# Patient Record
Sex: MALE | Race: WHITE | NOT HISPANIC OR LATINO
[De-identification: names, ages, dates, MRNs, and addresses within clinical notes are randomized per-mention and may not be internally consistent; named-entity substitution may affect disease eponyms.]

---

## 2017-10-06 PROBLEM — Z00.00 ENCOUNTER FOR PREVENTIVE HEALTH EXAMINATION: Status: ACTIVE | Noted: 2017-10-06

## 2017-10-30 ENCOUNTER — APPOINTMENT (OUTPATIENT)
Dept: PULMONOLOGY | Facility: CLINIC | Age: 52
End: 2017-10-30
Payer: COMMERCIAL

## 2017-10-30 VITALS — HEART RATE: 80 BPM | OXYGEN SATURATION: 98 %

## 2017-10-30 VITALS
SYSTOLIC BLOOD PRESSURE: 120 MMHG | HEIGHT: 70 IN | BODY MASS INDEX: 37.22 KG/M2 | WEIGHT: 260 LBS | DIASTOLIC BLOOD PRESSURE: 80 MMHG

## 2017-10-30 DIAGNOSIS — Z80.6 FAMILY HISTORY OF LEUKEMIA: ICD-10-CM

## 2017-10-30 DIAGNOSIS — Z83.3 FAMILY HISTORY OF DIABETES MELLITUS: ICD-10-CM

## 2017-10-30 DIAGNOSIS — Z80.3 FAMILY HISTORY OF MALIGNANT NEOPLASM OF BREAST: ICD-10-CM

## 2017-10-30 DIAGNOSIS — Z80.8 FAMILY HISTORY OF MALIGNANT NEOPLASM OF OTHER ORGANS OR SYSTEMS: ICD-10-CM

## 2017-10-30 DIAGNOSIS — Z78.9 OTHER SPECIFIED HEALTH STATUS: ICD-10-CM

## 2017-10-30 PROCEDURE — 85018 HEMOGLOBIN: CPT | Mod: QW

## 2017-10-30 PROCEDURE — 94727 GAS DIL/WSHOT DETER LNG VOL: CPT

## 2017-10-30 PROCEDURE — 99204 OFFICE O/P NEW MOD 45 MIN: CPT | Mod: 25

## 2017-10-30 PROCEDURE — 94664 DEMO&/EVAL PT USE INHALER: CPT | Mod: 59

## 2017-10-30 PROCEDURE — 94729 DIFFUSING CAPACITY: CPT

## 2017-10-30 PROCEDURE — 94060 EVALUATION OF WHEEZING: CPT

## 2018-01-17 ENCOUNTER — TRANSCRIPTION ENCOUNTER (OUTPATIENT)
Age: 53
End: 2018-01-17

## 2018-02-01 ENCOUNTER — TRANSCRIPTION ENCOUNTER (OUTPATIENT)
Age: 53
End: 2018-02-01

## 2018-02-01 ENCOUNTER — OUTPATIENT (OUTPATIENT)
Dept: OUTPATIENT SERVICES | Facility: HOSPITAL | Age: 53
LOS: 1 days | End: 2018-02-01
Payer: COMMERCIAL

## 2018-02-01 DIAGNOSIS — G47.33 OBSTRUCTIVE SLEEP APNEA (ADULT) (PEDIATRIC): ICD-10-CM

## 2018-02-01 PROCEDURE — 95810 POLYSOM 6/> YRS 4/> PARAM: CPT | Mod: 26

## 2018-02-01 PROCEDURE — 95810 POLYSOM 6/> YRS 4/> PARAM: CPT

## 2018-03-19 ENCOUNTER — APPOINTMENT (OUTPATIENT)
Dept: PULMONOLOGY | Facility: CLINIC | Age: 53
End: 2018-03-19
Payer: COMMERCIAL

## 2018-03-19 VITALS
WEIGHT: 261 LBS | OXYGEN SATURATION: 95 % | HEIGHT: 70 IN | DIASTOLIC BLOOD PRESSURE: 70 MMHG | SYSTOLIC BLOOD PRESSURE: 120 MMHG | HEART RATE: 81 BPM | BODY MASS INDEX: 37.37 KG/M2

## 2018-03-19 DIAGNOSIS — R94.2 ABNORMAL RESULTS OF PULMONARY FUNCTION STUDIES: ICD-10-CM

## 2018-03-19 DIAGNOSIS — R06.02 SHORTNESS OF BREATH: ICD-10-CM

## 2018-03-19 PROCEDURE — 99214 OFFICE O/P EST MOD 30 MIN: CPT

## 2018-04-16 ENCOUNTER — OUTPATIENT (OUTPATIENT)
Dept: OUTPATIENT SERVICES | Facility: HOSPITAL | Age: 53
LOS: 1 days | End: 2018-04-16
Payer: COMMERCIAL

## 2018-04-16 DIAGNOSIS — G47.33 OBSTRUCTIVE SLEEP APNEA (ADULT) (PEDIATRIC): ICD-10-CM

## 2018-04-16 PROCEDURE — 95811 POLYSOM 6/>YRS CPAP 4/> PARM: CPT

## 2018-04-16 PROCEDURE — 95811 POLYSOM 6/>YRS CPAP 4/> PARM: CPT | Mod: 26

## 2019-01-21 ENCOUNTER — NON-APPOINTMENT (OUTPATIENT)
Age: 54
End: 2019-01-21

## 2019-01-21 ENCOUNTER — APPOINTMENT (OUTPATIENT)
Dept: CARDIOLOGY | Facility: CLINIC | Age: 54
End: 2019-01-21
Payer: COMMERCIAL

## 2019-01-21 VITALS
HEIGHT: 70 IN | RESPIRATION RATE: 16 BRPM | WEIGHT: 235 LBS | DIASTOLIC BLOOD PRESSURE: 89 MMHG | SYSTOLIC BLOOD PRESSURE: 146 MMHG | HEART RATE: 65 BPM | BODY MASS INDEX: 33.64 KG/M2

## 2019-01-21 DIAGNOSIS — Z82.49 FAMILY HISTORY OF ISCHEMIC HEART DISEASE AND OTHER DISEASES OF THE CIRCULATORY SYSTEM: ICD-10-CM

## 2019-01-21 PROCEDURE — 93000 ELECTROCARDIOGRAM COMPLETE: CPT

## 2019-01-21 PROCEDURE — 99214 OFFICE O/P EST MOD 30 MIN: CPT

## 2019-01-21 NOTE — ASSESSMENT
[FreeTextEntry1] : EKG: Sinus rhythm with no significant ST or T wave changes.\par \par 53-year-old man with a past medical history of hypertension the past now on no medication, objective sleep apnea on CPAP, obesity who presents to me for followup evaluation. Patient seems to be doing well from a cardiac standpoint and has lost significant weight with changes in his diet. Blood pressure appears to be controlled. Her control is suboptimal however his ten-year risk remains generally in the low range. His blood pressures are pending better than they are here today but he should be checking more closely at home.

## 2019-01-21 NOTE — DISCUSSION/SUMMARY
[FreeTextEntry1] : 1. No additional cardiac testing at this time.\par 2. No additional cardiac medications at this time.\par 3. Patient encouraged to work on diet to lose weight.\par 4. Patient is encouraged to exercise at least 30 minutes a day everyday of the week.\par 5. Monitor BP at home, keep a log and bring to f/u.  He will have his wife bring results when she comes for followup as well.\par 6. Continue CPAP for sleep apnea.\par 7. Recheck blood work for lipids and hemoglobin A1c at this time.\par 8. Follow up here in one year or as needed.

## 2019-01-21 NOTE — HISTORY OF PRESENT ILLNESS
[FreeTextEntry1] : Patient returns to the office today feeling generally well and offering no complaints. He has been a lot of efforts with diet carbohydrates and sugars and with that has lost substantial weight. He admits to not do a lot of regular exercise however. He has not been checking his blood pressure at home but in the rare instance it is checked it has been under 130 systolic. Patient denies chest pain, shortness of breath, palpitations, orthopnea, presyncope, syncope.

## 2020-06-08 ENCOUNTER — APPOINTMENT (OUTPATIENT)
Dept: CARDIOLOGY | Facility: CLINIC | Age: 55
End: 2020-06-08

## 2020-08-12 ENCOUNTER — NON-APPOINTMENT (OUTPATIENT)
Age: 55
End: 2020-08-12

## 2020-08-12 ENCOUNTER — APPOINTMENT (OUTPATIENT)
Dept: CARDIOLOGY | Facility: CLINIC | Age: 55
End: 2020-08-12
Payer: COMMERCIAL

## 2020-08-12 VITALS
RESPIRATION RATE: 16 BRPM | OXYGEN SATURATION: 100 % | HEART RATE: 76 BPM | BODY MASS INDEX: 35.79 KG/M2 | SYSTOLIC BLOOD PRESSURE: 149 MMHG | TEMPERATURE: 98.2 F | WEIGHT: 250 LBS | HEIGHT: 70 IN | DIASTOLIC BLOOD PRESSURE: 89 MMHG

## 2020-08-12 DIAGNOSIS — E03.9 HYPOTHYROIDISM, UNSPECIFIED: ICD-10-CM

## 2020-08-12 DIAGNOSIS — I07.1 RHEUMATIC TRICUSPID INSUFFICIENCY: ICD-10-CM

## 2020-08-12 DIAGNOSIS — K21.9 GASTRO-ESOPHAGEAL REFLUX DISEASE W/OUT ESOPHAGITIS: ICD-10-CM

## 2020-08-12 PROCEDURE — 99214 OFFICE O/P EST MOD 30 MIN: CPT

## 2020-08-12 PROCEDURE — 93000 ELECTROCARDIOGRAM COMPLETE: CPT

## 2020-08-12 NOTE — DISCUSSION/SUMMARY
[FreeTextEntry1] : 1. Start nifedipine 30 mg daily.\par 2. Check exercise stress test and echocardiogram given his age and risk factors as well as some prior mild valve disease.\par 3. Patient encouraged to work on diet to lose weight.\par 4. Patient is encouraged to exercise at least 30 minutes a day everyday of the week.\par 5. Monitor BP at home, keep a log and bring to f/u.  \par 6. Continue CPAP for sleep apnea.\par 7. Follow up here in three months.

## 2020-08-12 NOTE — PHYSICAL EXAM
[General Appearance - In No Acute Distress] : no acute distress [Normal Conjunctiva] : the conjunctiva exhibited no abnormalities [Normal Oral Mucosa] : normal oral mucosa [Auscultation Breath Sounds / Voice Sounds] : lungs were clear to auscultation bilaterally [Abdomen Soft] : soft [Abnormal Walk] : normal gait [Abdomen Tenderness] : non-tender [Cyanosis, Localized] : no localized cyanosis [Skin Color & Pigmentation] : normal skin color and pigmentation [Nail Clubbing] : no clubbing of the fingernails [Affect] : the affect was normal [Oriented To Time, Place, And Person] : oriented to person, place, and time [FreeTextEntry1] : Cardiac: Normal S1 and split S2, no S3, no S4. No audible murmurs or rubs. Regular rate and rhythm.

## 2020-08-12 NOTE — HISTORY OF PRESENT ILLNESS
[FreeTextEntry1] : Patient presents today noting he recently has been having more headaches. He is one particularly severe headache that happened after he bent over and got back up. It was severe for a few seconds before slowly getting better. Otherwise he reports issues with chronic chest and back discomfort whenever he lifts or does any physical work. No other exertional symptoms. Blood pressures have been in the 120s to 140s, frequently in the 130s to 140s over 70s to 80s. Patient denies shortness of breath, palpitations, orthopnea, presyncope, syncope.

## 2020-11-12 ENCOUNTER — APPOINTMENT (OUTPATIENT)
Dept: CARDIOLOGY | Facility: CLINIC | Age: 55
End: 2020-11-12
Payer: COMMERCIAL

## 2020-11-12 DIAGNOSIS — R94.31 ABNORMAL ELECTROCARDIOGRAM [ECG] [EKG]: ICD-10-CM

## 2020-11-12 PROCEDURE — 93015 CV STRESS TEST SUPVJ I&R: CPT

## 2020-11-12 PROCEDURE — 99072 ADDL SUPL MATRL&STAF TM PHE: CPT

## 2020-11-12 PROCEDURE — 93306 TTE W/DOPPLER COMPLETE: CPT

## 2020-12-15 ENCOUNTER — APPOINTMENT (OUTPATIENT)
Dept: CARDIOLOGY | Facility: CLINIC | Age: 55
End: 2020-12-15
Payer: COMMERCIAL

## 2020-12-15 PROCEDURE — 93015 CV STRESS TEST SUPVJ I&R: CPT

## 2020-12-15 PROCEDURE — 99072 ADDL SUPL MATRL&STAF TM PHE: CPT

## 2020-12-15 PROCEDURE — 78452 HT MUSCLE IMAGE SPECT MULT: CPT

## 2020-12-15 PROCEDURE — A9500: CPT

## 2020-12-28 DIAGNOSIS — U07.1 COVID-19: ICD-10-CM

## 2020-12-29 ENCOUNTER — APPOINTMENT (OUTPATIENT)
Dept: CARDIOLOGY | Facility: CLINIC | Age: 55
End: 2020-12-29

## 2020-12-31 ENCOUNTER — APPOINTMENT (OUTPATIENT)
Dept: CARDIOLOGY | Facility: CLINIC | Age: 55
End: 2020-12-31

## 2021-01-04 ENCOUNTER — APPOINTMENT (OUTPATIENT)
Dept: CARDIOLOGY | Facility: CLINIC | Age: 56
End: 2021-01-04

## 2021-01-19 ENCOUNTER — APPOINTMENT (OUTPATIENT)
Dept: CARDIOLOGY | Facility: CLINIC | Age: 56
End: 2021-01-19

## 2021-02-08 ENCOUNTER — RX RENEWAL (OUTPATIENT)
Age: 56
End: 2021-02-08

## 2021-02-17 RX ORDER — KIT FOR THE PREPARATION OF TECHNETIUM TC99M SESTAMIBI 1 MG/5ML
INJECTION, POWDER, LYOPHILIZED, FOR SOLUTION PARENTERAL
Refills: 0 | Status: COMPLETED | OUTPATIENT
Start: 2021-02-17

## 2021-02-17 RX ADMIN — KIT FOR THE PREPARATION OF TECHNETIUM TC99M SESTAMIBI 0: 1 INJECTION, POWDER, LYOPHILIZED, FOR SOLUTION PARENTERAL at 00:00

## 2021-03-08 ENCOUNTER — APPOINTMENT (OUTPATIENT)
Dept: CARDIOLOGY | Facility: CLINIC | Age: 56
End: 2021-03-08
Payer: COMMERCIAL

## 2021-03-08 ENCOUNTER — NON-APPOINTMENT (OUTPATIENT)
Age: 56
End: 2021-03-08

## 2021-03-08 VITALS
HEIGHT: 70 IN | RESPIRATION RATE: 16 BRPM | SYSTOLIC BLOOD PRESSURE: 138 MMHG | WEIGHT: 250 LBS | DIASTOLIC BLOOD PRESSURE: 83 MMHG | HEART RATE: 71 BPM | BODY MASS INDEX: 35.79 KG/M2 | TEMPERATURE: 97.9 F | OXYGEN SATURATION: 98 %

## 2021-03-08 DIAGNOSIS — R94.39 ABNORMAL RESULT OF OTHER CARDIOVASCULAR FUNCTION STUDY: ICD-10-CM

## 2021-03-08 PROCEDURE — 99072 ADDL SUPL MATRL&STAF TM PHE: CPT

## 2021-03-08 PROCEDURE — 99214 OFFICE O/P EST MOD 30 MIN: CPT

## 2021-03-08 NOTE — ASSESSMENT
[FreeTextEntry1] : Echocardiogram November 12, 2020 demonstrated left ventricle normal size and function with ejection fraction of 55 to 60%.  Trace pulmonic regurgitation noted but no significant structural abnormalities.\par \par Exercise stress test November 12, 2020 during which the patient exercised via a Jerman protocol for 9 minutes, totaling 10 METS.  Peak heart rate achieved was 148 bpm, representing 90% of age-predicted maximum.  Blood pressure was elevated at baseline with a normal response to exercise.  EKG showed horizontal ST depressions in the inferior and anterolateral leads concerning for ischemia.\par \par Following abnormal exercise stress testing patient underwent nuclear stress test December 15, 2020 during which he exercised via a Jerman protocol for 10 minutes, totaling 11 METS.  Peak heart rate achieved was 142 bpm, representing 86% of age-predicted maximum.  Blood pressure response was normal.  EKG showed inferior and anterolateral ST depressions concerning for ischemia.  Evaluation of nuclear imaging showed a reversible defect in the inferior wall concerning for possible ischemia in the setting of significant diaphragmatic and bowel attenuation.  Patient also noted to have borderline TID visually.\par \par 55-year-old man with a past medical history of hypertension, objective sleep apnea on CPAP, obesity who presents to me for followup evaluation.  Blood pressure is a little better here today but he needs to be checking it at home.  I will make no additions or changes to his medication for now.  Echocardiogram was unremarkable with a normal EF and no significant valvulopathy.  Patient had abnormal exercise stress test which prompted a nuclear stress test showing a questionable area of inferior ischemia, which appears more likely be related to artifact.  There was however borderline TID, and I will plan cardiac CTA to rule out CAD conclusively.  He is using his sleep mask faithfully.  We talked about the need to get back on his diet and lose weight.

## 2021-03-08 NOTE — HISTORY OF PRESENT ILLNESS
[FreeTextEntry1] : Patient presents back today feeling well and offering no complaints.  He tolerated the addition of nifedipine without a problem.  However he has not been checking his blood pressure at home.  He has been more active recently and hunting season walking up to 15 miles a day on the weekends.  He is trying to be more active during the week as well.  He reports no exertional complaints.  Unfortunately he is still not improved his diet and has not managed to lose any weight.  Patient denies chest pain, shortness of breath, palpitations, orthopnea, presyncope, syncope.

## 2021-03-08 NOTE — DISCUSSION/SUMMARY
[FreeTextEntry1] : 1. Check cardiac CTA to evaluate his abnormal nuclear stress test.\par 2. Continue nifedipine for hypertension.\par 3. Monitor BP at home, keep a log and bring to f/u.\par 4. Patient encouraged to work on diet to lose weight.\par 5. Continue CPAP for sleep apnea.\par 6. Follow up here in one month.

## 2021-04-05 ENCOUNTER — LABORATORY RESULT (OUTPATIENT)
Age: 56
End: 2021-04-05

## 2021-04-12 ENCOUNTER — OUTPATIENT (OUTPATIENT)
Dept: OUTPATIENT SERVICES | Facility: HOSPITAL | Age: 56
LOS: 1 days | End: 2021-04-12
Payer: COMMERCIAL

## 2021-04-12 DIAGNOSIS — R94.39 ABNORMAL RESULT OF OTHER CARDIOVASCULAR FUNCTION STUDY: ICD-10-CM

## 2021-04-12 PROCEDURE — 75574 CT ANGIO HRT W/3D IMAGE: CPT

## 2021-04-12 PROCEDURE — 75574 CT ANGIO HRT W/3D IMAGE: CPT | Mod: 26

## 2021-04-14 ENCOUNTER — APPOINTMENT (OUTPATIENT)
Dept: CARDIOLOGY | Facility: CLINIC | Age: 56
End: 2021-04-14
Payer: COMMERCIAL

## 2021-04-14 VITALS
OXYGEN SATURATION: 95 % | RESPIRATION RATE: 16 BRPM | HEART RATE: 77 BPM | TEMPERATURE: 97.7 F | BODY MASS INDEX: 35.07 KG/M2 | WEIGHT: 245 LBS | DIASTOLIC BLOOD PRESSURE: 86 MMHG | SYSTOLIC BLOOD PRESSURE: 129 MMHG | HEIGHT: 70 IN

## 2021-04-14 PROCEDURE — 99214 OFFICE O/P EST MOD 30 MIN: CPT

## 2021-04-14 PROCEDURE — 99072 ADDL SUPL MATRL&STAF TM PHE: CPT

## 2021-04-14 RX ORDER — METOPROLOL TARTRATE 50 MG/1
50 TABLET, FILM COATED ORAL
Qty: 2 | Refills: 0 | Status: DISCONTINUED | COMMUNITY
Start: 2021-03-17 | End: 2021-04-14

## 2021-04-14 RX ORDER — OMEPRAZOLE 40 MG/1
CAPSULE, DELAYED RELEASE ORAL
Refills: 0 | Status: DISCONTINUED | COMMUNITY
End: 2021-04-14

## 2021-04-14 RX ORDER — ASPIRIN ENTERIC COATED TABLETS 81 MG 81 MG/1
81 TABLET, DELAYED RELEASE ORAL
Qty: 90 | Refills: 1 | Status: ACTIVE | COMMUNITY
Start: 2021-04-14

## 2021-04-14 NOTE — HISTORY OF PRESENT ILLNESS
[FreeTextEntry1] : Patient presents back today feeling well and offering no complaints.  He has managed to lose a few pounds and is continues work with diet.  He has not really been doing exercise awaiting the results of his testing.  He does not report any symptoms within his normal daily activities.  His blood pressures have been in the 120s to 140s over 70s to 80s.  Patient denies chest pain, shortness of breath, palpitations, orthopnea, presyncope, syncope.

## 2021-04-14 NOTE — DISCUSSION/SUMMARY
[FreeTextEntry1] : 1. No additional cardiac testing at this time.\par 2. Add aspirin 81 mg daily and atorvastatin 20 mg daily given his mild CAD.  Repeat blood work in 6 weeks.\par 3. Increase nifedipine to 60 mg daily.  If his blood pressure hung elevated consider change amlodipine or adding an additional medication.\par 4. Monitor BP at home, keep a log and bring to f/u.\par 5. Patient encouraged to work on diet to lose weight.\par 6. Patient is encouraged to exercise at least 30 minutes a day everyday of the week.\par 7. Continue CPAP for sleep apnea.\par 8. Follow up here in 2 months.

## 2021-04-14 NOTE — ASSESSMENT
[FreeTextEntry1] : Echocardiogram November 12, 2020 demonstrated left ventricle normal size and function with ejection fraction of 55 to 60%.  Trace pulmonic regurgitation noted but no significant structural abnormalities.\par \par Exercise stress test November 12, 2020 during which the patient exercised via a Jerman protocol for 9 minutes, totaling 10 METS.  Peak heart rate achieved was 148 bpm, representing 90% of age-predicted maximum.  Blood pressure was elevated at baseline with a normal response to exercise.  EKG showed horizontal ST depressions in the inferior and anterolateral leads concerning for ischemia.\par \par Following abnormal exercise stress testing patient underwent nuclear stress test December 15, 2020 during which he exercised via a Jerman protocol for 10 minutes, totaling 11 METS.  Peak heart rate achieved was 142 bpm, representing 86% of age-predicted maximum.  Blood pressure response was normal.  EKG showed inferior and anterolateral ST depressions concerning for ischemia.  Evaluation of nuclear imaging showed a reversible defect in the inferior wall concerning for possible ischemia in the setting of significant diaphragmatic and bowel attenuation.  Patient also noted to have borderline TID visually.\par \par Cardiac CTA April 12, 2021 demonstrates soft plaque causing mild stenosis in the proximal LAD and proximal circumflex.  Calcium score of 4.\par \par 55-year-old man with a past medical history of hypertension, objective sleep apnea on CPAP, obesity who presents to me for followup evaluation.  Cardiac CTA does show mild stenosis caused by soft plaques and a very low calcium score.  Overall given the very mild CAD I do recommend aspirin and statin therapy.  Additionally I will increase his nifedipine given his elevated blood pressures.  He has previously been on amlodipine with good results and if the increase in nifedipine does not work I might consider going back to amlodipine or adding additional medication.  Certainly he needs to work more on losing weight and can get back into doing exercise at this time which hopefully will help.  He remains asymptomatic at this time.

## 2021-06-11 LAB
ESTIMATED AVERAGE GLUCOSE: 111 MG/DL
HBA1C MFR BLD HPLC: 5.5 %

## 2021-06-15 LAB
ALBUMIN SERPL ELPH-MCNC: 4.3 G/DL
ALP BLD-CCNC: 77 U/L
ALT SERPL-CCNC: 21 U/L
ANION GAP SERPL CALC-SCNC: 10 MMOL/L
AST SERPL-CCNC: 20 U/L
BILIRUB SERPL-MCNC: 0.6 MG/DL
BUN SERPL-MCNC: 18 MG/DL
CALCIUM SERPL-MCNC: 9.4 MG/DL
CHLORIDE SERPL-SCNC: 102 MMOL/L
CHOLEST SERPL-MCNC: 136 MG/DL
CO2 SERPL-SCNC: 27 MMOL/L
CREAT SERPL-MCNC: 1.11 MG/DL
GLUCOSE SERPL-MCNC: 101 MG/DL
HDLC SERPL-MCNC: 38 MG/DL
LDLC SERPL CALC-MCNC: 73 MG/DL
MAGNESIUM SERPL-MCNC: 2.1 MG/DL
NONHDLC SERPL-MCNC: 98 MG/DL
POTASSIUM SERPL-SCNC: 4.3 MMOL/L
PROT SERPL-MCNC: 6.7 G/DL
SODIUM SERPL-SCNC: 139 MMOL/L
TRIGL SERPL-MCNC: 127 MG/DL

## 2021-06-17 ENCOUNTER — NON-APPOINTMENT (OUTPATIENT)
Age: 56
End: 2021-06-17

## 2021-06-17 ENCOUNTER — APPOINTMENT (OUTPATIENT)
Dept: CARDIOLOGY | Facility: CLINIC | Age: 56
End: 2021-06-17
Payer: COMMERCIAL

## 2021-06-17 VITALS
OXYGEN SATURATION: 98 % | SYSTOLIC BLOOD PRESSURE: 126 MMHG | RESPIRATION RATE: 16 BRPM | HEART RATE: 70 BPM | BODY MASS INDEX: 33.36 KG/M2 | DIASTOLIC BLOOD PRESSURE: 77 MMHG | WEIGHT: 233 LBS | HEIGHT: 70 IN | TEMPERATURE: 98 F

## 2021-06-17 PROCEDURE — 93000 ELECTROCARDIOGRAM COMPLETE: CPT

## 2021-06-17 PROCEDURE — 99072 ADDL SUPL MATRL&STAF TM PHE: CPT

## 2021-06-17 PROCEDURE — 99214 OFFICE O/P EST MOD 30 MIN: CPT

## 2021-06-17 NOTE — DISCUSSION/SUMMARY
[FreeTextEntry1] : 1. No additional cardiac testing at this time.\par 2. Continue current cardiac meds in doses as noted above for hypertension, dyslipidemia and CAD.\par 3. Monitor BP at home, keep a log and bring to f/u.\par 4. Patient encouraged to work on diet to lose weight.\par 5. Patient is encouraged to exercise at least 30 minutes a day everyday of the week.\par 6. Continue CPAP for sleep apnea.\par 7. Follow up here in 4 months.

## 2021-06-17 NOTE — ASSESSMENT
[FreeTextEntry1] : Echocardiogram November 12, 2020 demonstrated left ventricle normal size and function with ejection fraction of 55 to 60%.  Trace pulmonic regurgitation noted but no significant structural abnormalities.\par \par Exercise stress test November 12, 2020 during which the patient exercised via a Jerman protocol for 9 minutes, totaling 10 METS.  Peak heart rate achieved was 148 bpm, representing 90% of age-predicted maximum.  Blood pressure was elevated at baseline with a normal response to exercise.  EKG showed horizontal ST depressions in the inferior and anterolateral leads concerning for ischemia.\par \par Following abnormal exercise stress testing patient underwent nuclear stress test December 15, 2020 during which he exercised via a Jerman protocol for 10 minutes, totaling 11 METS.  Peak heart rate achieved was 142 bpm, representing 86% of age-predicted maximum.  Blood pressure response was normal.  EKG showed inferior and anterolateral ST depressions concerning for ischemia.  Evaluation of nuclear imaging showed a reversible defect in the inferior wall concerning for possible ischemia in the setting of significant diaphragmatic and bowel attenuation.  Patient also noted to have borderline TID visually.\par \par Cardiac CTA April 12, 2021 demonstrates soft plaque causing mild stenosis in the proximal LAD and proximal circumflex.  Calcium score of 4.\par \par EKG: Sinus rhythm with no significant ST or T wave changes.\par \par 55-year-old man with a past medical history of mild CAD, hypertension, objective sleep apnea on CPAP, obesity who presents to me for followup evaluation.  Patient presents back to the office today feeling very well.  No evidence of ischemia at this time.  He tolerated the increase in nifedipine and the addition of atorvastatin.  Blood pressure not very well controlled.  Lipids are controlled on as well on most recent blood work.  No evidence of diabetes.  EKG is unremarkable.  I have encouraged the patient to get into exercising more but otherwise he is doing very well.

## 2021-06-17 NOTE — HISTORY OF PRESENT ILLNESS
[FreeTextEntry1] : Patient presents back today feeling very well and offering no complaints.  He admits that he has not really been doing exercise but is very active at work and is able to do all of his activity without any physical limitations.  Blood pressures at home have been in the 110s to 120s over 60s to 70s and he tolerated the increase in nifedipine well.  He also tolerated the addition of atorvastatin without a problem.  Patient denies chest pain, shortness of breath, palpitations, orthopnea, presyncope, syncope.

## 2021-10-14 ENCOUNTER — NON-APPOINTMENT (OUTPATIENT)
Age: 56
End: 2021-10-14

## 2021-10-14 ENCOUNTER — APPOINTMENT (OUTPATIENT)
Dept: CARDIOLOGY | Facility: CLINIC | Age: 56
End: 2021-10-14
Payer: COMMERCIAL

## 2021-10-14 VITALS
OXYGEN SATURATION: 98 % | WEIGHT: 249 LBS | BODY MASS INDEX: 35.65 KG/M2 | HEIGHT: 70 IN | DIASTOLIC BLOOD PRESSURE: 88 MMHG | RESPIRATION RATE: 16 BRPM | HEART RATE: 66 BPM | SYSTOLIC BLOOD PRESSURE: 137 MMHG

## 2021-10-14 PROCEDURE — 93000 ELECTROCARDIOGRAM COMPLETE: CPT

## 2021-10-14 PROCEDURE — 99214 OFFICE O/P EST MOD 30 MIN: CPT

## 2021-10-14 NOTE — HISTORY OF PRESENT ILLNESS
[FreeTextEntry1] : Patient presents back to the office today feeling physically well with the exception of his back which continues to be an issue. This is continued to make him less active and he has gained back some more weight. He notes that this is something he needs to work on and plans to try to be more active. Blood pressures have been in the 110s to 120s over 60s to 70s. He reports no other physical symptoms. Patient denies chest pain, shortness of breath, palpitations, orthopnea, presyncope, syncope.

## 2021-10-14 NOTE — DISCUSSION/SUMMARY
[FreeTextEntry1] : 1. No additional cardiac testing at this time.\par 2. Continue current cardiac meds in doses as noted above for hypertension, dyslipidemia and CAD.\par 3. Monitor BP at home, keep a log and bring to f/u.\par 4. Patient encouraged to work on diet to lose weight.\par 5. Patient is encouraged to exercise at least 30 minutes a day everyday of the week.\par 6. Continue CPAP for sleep apnea.\par 7. Follow up here in 6 months.

## 2021-10-14 NOTE — ASSESSMENT
[FreeTextEntry1] : Echocardiogram November 12, 2020 demonstrated left ventricle normal size and function with ejection fraction of 55 to 60%.  Trace pulmonic regurgitation noted but no significant structural abnormalities.\par \par Exercise stress test November 12, 2020 during which the patient exercised via a Jerman protocol for 9 minutes, totaling 10 METS.  Peak heart rate achieved was 148 bpm, representing 90% of age-predicted maximum.  Blood pressure was elevated at baseline with a normal response to exercise.  EKG showed horizontal ST depressions in the inferior and anterolateral leads concerning for ischemia.\par \par Following abnormal exercise stress testing patient underwent nuclear stress test December 15, 2020 during which he exercised via a Jerman protocol for 10 minutes, totaling 11 METS.  Peak heart rate achieved was 142 bpm, representing 86% of age-predicted maximum.  Blood pressure response was normal.  EKG showed inferior and anterolateral ST depressions concerning for ischemia.  Evaluation of nuclear imaging showed a reversible defect in the inferior wall concerning for possible ischemia in the setting of significant diaphragmatic and bowel attenuation.  Patient also noted to have borderline TID visually.\par \par Cardiac CTA April 12, 2021 demonstrates soft plaque causing mild stenosis in the proximal LAD and proximal circumflex.  Calcium score of 4.\par \par EKG: Sinus rhythm with no significant ST or T wave changes.\par \par 56-year-old man with a past medical history of mild CAD, hypertension, objective sleep apnea on CPAP, obesity who presents to me for followup evaluation.  Patient presents back to the office today feeling very well from a cardiovascular perspective.  No evidence of ischemia at this time. Blood pressures appear to be well controlled at this time and he is tolerating his medication. Lipids are controlled as well. His HDL is a bit low and I have once again encouraged exercise. Unfortunately has been less active because of issues with his back and with that has gained some weight. I encouraged him to get back into doing some exercise and also to work on his diet to lose the weight.

## 2022-03-29 ENCOUNTER — RX RENEWAL (OUTPATIENT)
Age: 57
End: 2022-03-29

## 2022-04-11 ENCOUNTER — RX RENEWAL (OUTPATIENT)
Age: 57
End: 2022-04-11

## 2022-09-14 ENCOUNTER — APPOINTMENT (OUTPATIENT)
Dept: CARDIOLOGY | Facility: CLINIC | Age: 57
End: 2022-09-14

## 2022-09-14 ENCOUNTER — NON-APPOINTMENT (OUTPATIENT)
Age: 57
End: 2022-09-14

## 2022-09-14 VITALS
SYSTOLIC BLOOD PRESSURE: 141 MMHG | HEIGHT: 70 IN | DIASTOLIC BLOOD PRESSURE: 84 MMHG | OXYGEN SATURATION: 97 % | WEIGHT: 254 LBS | RESPIRATION RATE: 16 BRPM | BODY MASS INDEX: 36.36 KG/M2 | HEART RATE: 73 BPM

## 2022-09-14 PROCEDURE — 93000 ELECTROCARDIOGRAM COMPLETE: CPT

## 2022-09-14 PROCEDURE — 99214 OFFICE O/P EST MOD 30 MIN: CPT | Mod: 25

## 2022-09-14 RX ORDER — LEVOTHYROXINE SODIUM 0.17 MG/1
175 TABLET ORAL DAILY
Refills: 0 | Status: ACTIVE | COMMUNITY

## 2022-09-14 NOTE — ASSESSMENT
[FreeTextEntry1] : Echocardiogram November 12, 2020 demonstrated left ventricle normal size and function with ejection fraction of 55 to 60%.  Trace pulmonic regurgitation noted but no significant structural abnormalities.\par \par Exercise stress test November 12, 2020 during which the patient exercised via a Jerman protocol for 9 minutes, totaling 10 METS.  Peak heart rate achieved was 148 bpm, representing 90% of age-predicted maximum.  Blood pressure was elevated at baseline with a normal response to exercise.  EKG showed horizontal ST depressions in the inferior and anterolateral leads concerning for ischemia.\par \par Following abnormal exercise stress testing patient underwent nuclear stress test December 15, 2020 during which he exercised via a Jerman protocol for 10 minutes, totaling 11 METS.  Peak heart rate achieved was 142 bpm, representing 86% of age-predicted maximum.  Blood pressure response was normal.  EKG showed inferior and anterolateral ST depressions concerning for ischemia.  Evaluation of nuclear imaging showed a reversible defect in the inferior wall concerning for possible ischemia in the setting of significant diaphragmatic and bowel attenuation.  Patient also noted to have borderline TID visually.\par \par Cardiac CTA April 12, 2021 demonstrates soft plaque causing mild stenosis in the proximal LAD and proximal circumflex.  Calcium score of 4.\par \par EKG: Sinus rhythm with no significant ST or T wave changes.\par \par 56-year-old man with a past medical history of mild CAD, hypertension, objective sleep apnea on CPAP, obesity who presents to me for followup evaluation.  Patient presents back to the office today feeling very well from a cardiovascular perspective.  No evidence of ischemia at this time. Blood pressures appear to be well controlled at this time and he is tolerating his medication. Lipids are controlled as well.  He has been having a lot of body aches and wonders if this is secondary to his statin but is willing to continue it for now.  I suggested trying coenzyme Q10 to see if this might help before making any changes to his medication.

## 2022-09-14 NOTE — DISCUSSION/SUMMARY
[FreeTextEntry1] : 1. No additional cardiac testing at this time.\par 2. Continue current cardiac meds in doses as noted above for hypertension, dyslipidemia and CAD.  He will add co-Q10 to see if this helps with his aches and pains.\par 3. Monitor BP at home, keep a log and bring to f/u.\par 4. Patient encouraged to work on diet to lose weight.\par 5. Patient is encouraged to exercise at least 30 minutes a day everyday of the week.\par 6. Continue CPAP for sleep apnea.\par 7. Follow up here in 6 months. [EKG obtained to assist in diagnosis and management of assessed problem(s)] : EKG obtained to assist in diagnosis and management of assessed problem(s)

## 2022-09-14 NOTE — HISTORY OF PRESENT ILLNESS
[FreeTextEntry1] : Patient presents to the office today feeling generally well.  He does report a lot of issues with body aches and wonders if in part this could be related to his statin.  He does admit that his aches have been going on even since before he was on the statin.  He is still able to all his normal daily activities and some exercise without any symptoms or limitations.  He is otherwise tolerating his medication without a problem.  Patient denies chest pain, shortness of breath, palpitations, orthopnea, presyncope, syncope.

## 2022-11-14 ENCOUNTER — NON-APPOINTMENT (OUTPATIENT)
Age: 57
End: 2022-11-14

## 2023-03-07 ENCOUNTER — APPOINTMENT (OUTPATIENT)
Dept: CARDIOLOGY | Facility: CLINIC | Age: 58
End: 2023-03-07
Payer: COMMERCIAL

## 2023-03-07 ENCOUNTER — NON-APPOINTMENT (OUTPATIENT)
Age: 58
End: 2023-03-07

## 2023-03-07 VITALS
DIASTOLIC BLOOD PRESSURE: 80 MMHG | SYSTOLIC BLOOD PRESSURE: 128 MMHG | WEIGHT: 247 LBS | HEART RATE: 75 BPM | RESPIRATION RATE: 16 BRPM | BODY MASS INDEX: 35.44 KG/M2 | OXYGEN SATURATION: 97 %

## 2023-03-07 PROCEDURE — 99214 OFFICE O/P EST MOD 30 MIN: CPT | Mod: 25

## 2023-03-07 PROCEDURE — 93000 ELECTROCARDIOGRAM COMPLETE: CPT

## 2023-03-07 RX ORDER — PANTOPRAZOLE 40 MG/1
40 TABLET, DELAYED RELEASE ORAL DAILY
Qty: 90 | Refills: 0 | Status: ACTIVE | COMMUNITY

## 2023-03-07 NOTE — HISTORY OF PRESENT ILLNESS
[FreeTextEntry1] : Patient presents back to the office today feeling generally well.  He never tried the co-Q10 is still some issues with aches and pains but blames it more on the things he does not getting older.  He has been having some issues with GERD but actually has been better until yesterday when he ate some ham and felt some reflux.  He followed up with his gastroenterologist recently and actually had his Protonix dose lowered.  Blood pressures have been a little higher recently in 120s to 140s over 60s to 80s.  He does not report any symptoms when he exerts himself although admits to not doing any regular exercise.  He is tolerating his medication without a problem.  Patient denies chest pain, shortness of breath, palpitations, orthopnea, presyncope, syncope.

## 2023-03-07 NOTE — ASSESSMENT
[FreeTextEntry1] : Echocardiogram November 12, 2020 demonstrated left ventricle normal size and function with ejection fraction of 55 to 60%.  Trace pulmonic regurgitation noted but no significant structural abnormalities.\par \par Exercise stress test November 12, 2020 during which the patient exercised via a Jerman protocol for 9 minutes, totaling 10 METS.  Peak heart rate achieved was 148 bpm, representing 90% of age-predicted maximum.  Blood pressure was elevated at baseline with a normal response to exercise.  EKG showed horizontal ST depressions in the inferior and anterolateral leads concerning for ischemia.\par \par Following abnormal exercise stress testing patient underwent nuclear stress test December 15, 2020 during which he exercised via a Jerman protocol for 10 minutes, totaling 11 METS.  Peak heart rate achieved was 142 bpm, representing 86% of age-predicted maximum.  Blood pressure response was normal.  EKG showed inferior and anterolateral ST depressions concerning for ischemia.  Evaluation of nuclear imaging showed a reversible defect in the inferior wall concerning for possible ischemia in the setting of significant diaphragmatic and bowel attenuation.  Patient also noted to have borderline TID visually.\par \par Cardiac CTA April 12, 2021 demonstrates soft plaque causing mild stenosis in the proximal LAD and proximal circumflex.  Calcium score of 4.\par \par EKG: Sinus rhythm with no significant ST or T wave changes.\par \par 56-year-old man with a past medical history of mild CAD, hypertension, objective sleep apnea on CPAP, obesity who presents to me for followup evaluation.  Patient presents back to the office today feeling very well from a cardiovascular perspective.  No evidence of ischemia at this time.  Blood pressure has been a little higher recently.  I am wondering if his machine is accurate and have asked him to bring it in to have it checked before considering any changes to his medication.  EKG is unremarkable.  No evidence of ischemia at this time.  He continues to use his CPAP machine without a problem.

## 2023-03-07 NOTE — DISCUSSION/SUMMARY
[FreeTextEntry1] : 1. No additional cardiac testing at this time.\par 2. Continue current cardiac meds in doses as noted above for hypertension, dyslipidemia and CAD.  He can consider co-Q10 to see if this helps with his aches and pains.\par 3. Monitor BP at home, keep a log and bring to f/u.  He will bring his machine in to have it calibrated.\par 4. Patient encouraged to work on diet to lose weight.\par 5. Patient is encouraged to exercise at least 30 minutes a day everyday of the week.\par 6. Continue CPAP for sleep apnea.\par 7. Follow up here in 3 months. [EKG obtained to assist in diagnosis and management of assessed problem(s)] : EKG obtained to assist in diagnosis and management of assessed problem(s)

## 2023-03-16 ENCOUNTER — NON-APPOINTMENT (OUTPATIENT)
Age: 58
End: 2023-03-16

## 2023-03-19 ENCOUNTER — TRANSCRIPTION ENCOUNTER (OUTPATIENT)
Age: 58
End: 2023-03-19

## 2023-03-22 ENCOUNTER — APPOINTMENT (OUTPATIENT)
Dept: CARDIOLOGY | Facility: CLINIC | Age: 58
End: 2023-03-22
Payer: COMMERCIAL

## 2023-03-22 PROCEDURE — ZZZZZ: CPT

## 2023-07-27 ENCOUNTER — APPOINTMENT (OUTPATIENT)
Dept: CARDIOLOGY | Facility: CLINIC | Age: 58
End: 2023-07-27
Payer: COMMERCIAL

## 2023-07-27 ENCOUNTER — NON-APPOINTMENT (OUTPATIENT)
Age: 58
End: 2023-07-27

## 2023-07-27 VITALS
HEIGHT: 70 IN | SYSTOLIC BLOOD PRESSURE: 120 MMHG | OXYGEN SATURATION: 95 % | DIASTOLIC BLOOD PRESSURE: 82 MMHG | WEIGHT: 242 LBS | RESPIRATION RATE: 16 BRPM | HEART RATE: 75 BPM | BODY MASS INDEX: 34.65 KG/M2

## 2023-07-27 PROCEDURE — 93000 ELECTROCARDIOGRAM COMPLETE: CPT

## 2023-07-27 PROCEDURE — 99214 OFFICE O/P EST MOD 30 MIN: CPT | Mod: 25

## 2023-07-27 RX ORDER — VALSARTAN 80 MG/1
80 TABLET, COATED ORAL
Qty: 90 | Refills: 1 | Status: DISCONTINUED | COMMUNITY
Start: 2023-07-27 | End: 2023-07-27

## 2023-07-27 NOTE — ASSESSMENT
[FreeTextEntry1] : Echocardiogram November 12, 2020 demonstrated left ventricle normal size and function with ejection fraction of 55 to 60%.  Trace pulmonic regurgitation noted but no significant structural abnormalities.\par \par Exercise stress test November 12, 2020 during which the patient exercised via a Jerman protocol for 9 minutes, totaling 10 METS.  Peak heart rate achieved was 148 bpm, representing 90% of age-predicted maximum.  Blood pressure was elevated at baseline with a normal response to exercise.  EKG showed horizontal ST depressions in the inferior and anterolateral leads concerning for ischemia.\par \par Following abnormal exercise stress testing patient underwent nuclear stress test December 15, 2020 during which he exercised via a Jerman protocol for 10 minutes, totaling 11 METS.  Peak heart rate achieved was 142 bpm, representing 86% of age-predicted maximum.  Blood pressure response was normal.  EKG showed inferior and anterolateral ST depressions concerning for ischemia.  Evaluation of nuclear imaging showed a reversible defect in the inferior wall concerning for possible ischemia in the setting of significant diaphragmatic and bowel attenuation.  Patient also noted to have borderline TID visually.\par \par Cardiac CTA April 12, 2021 demonstrates soft plaque causing mild stenosis in the proximal LAD and proximal circumflex.  Calcium score of 4.\par \par EKG: Sinus rhythm with no significant ST or T wave changes.\par \par 57-year-old man with a past medical history of mild CAD, hypertension, objective sleep apnea on CPAP, obesity who presents to me for followup evaluation.  Patient presents back today appearing stable from a cardiac standpoint although his blood pressures do seem to be elevated.  His machine has been checked and is accurate.  I have suggested starting additional medication of hypertension and he is willing to do so at this time.  He is going to have repeat blood work with his endocrinologist tomorrow and will get me a copy.  EKG is unremarkable.  He tolerated his hernia surgery without a problem.

## 2023-07-27 NOTE — HISTORY OF PRESENT ILLNESS
[FreeTextEntry1] : Patient resents back today having had hernia surgery last week which ultimately went well.  The hernia was apparently bigger than they thought he had some bleeding but he still was able to go home the same day and is recovering well.  His sister was recently in the hospital with what appears to have been an episode of severe hyperglycemia associated with troponin elevation but apparently no obstructive coronary disease.  Blood pressures have been in the 130s to 140s over 70s to 80s at home.  He continues to tolerate his medication without a problem.  Patient denies chest pain, shortness of breath, palpitations, orthopnea, presyncope, syncope.

## 2023-07-27 NOTE — DISCUSSION/SUMMARY
[FreeTextEntry1] : 1. No additional cardiac testing at this time.\par 2. Add telmisartan 20mg daily for hypertension.  Repeat blood work in a month.\par 3. Continue other current cardiac meds in doses as noted above for hypertension, dyslipidemia and CAD.  \par 4. Monitor BP at home, keep a log and bring to f/u.  He will bring his machine in to have it calibrated.\par 5. Patient encouraged to work on diet to lose weight.\par 6. Patient is encouraged to exercise at least 30 minutes a day everyday of the week.\par 7. Continue CPAP for sleep apnea.\par 8. He is going to have blood work with his endocrinologist tomorrow and will get me a copy of the results.\par 9. Follow up here in 3 months. [EKG obtained to assist in diagnosis and management of assessed problem(s)] : EKG obtained to assist in diagnosis and management of assessed problem(s)

## 2023-09-12 ENCOUNTER — RX RENEWAL (OUTPATIENT)
Age: 58
End: 2023-09-12

## 2023-10-16 ENCOUNTER — NON-APPOINTMENT (OUTPATIENT)
Age: 58
End: 2023-10-16

## 2023-10-16 ENCOUNTER — APPOINTMENT (OUTPATIENT)
Dept: CARDIOLOGY | Facility: CLINIC | Age: 58
End: 2023-10-16
Payer: COMMERCIAL

## 2023-10-16 VITALS
HEIGHT: 70 IN | RESPIRATION RATE: 16 BRPM | HEART RATE: 76 BPM | SYSTOLIC BLOOD PRESSURE: 130 MMHG | BODY MASS INDEX: 36.65 KG/M2 | DIASTOLIC BLOOD PRESSURE: 80 MMHG | WEIGHT: 256 LBS

## 2023-10-16 PROCEDURE — 93000 ELECTROCARDIOGRAM COMPLETE: CPT

## 2023-10-16 PROCEDURE — 99214 OFFICE O/P EST MOD 30 MIN: CPT | Mod: 25

## 2024-02-22 RX ORDER — TELMISARTAN 20 MG/1
20 TABLET ORAL
Qty: 90 | Refills: 1 | Status: ACTIVE | COMMUNITY
Start: 2023-07-27 | End: 1900-01-01

## 2024-03-17 ENCOUNTER — EMERGENCY (EMERGENCY)
Facility: HOSPITAL | Age: 59
LOS: 1 days | Discharge: HOME | End: 2024-03-17
Admitting: EMERGENCY MEDICINE
Payer: COMMERCIAL

## 2024-03-17 PROCEDURE — 85610 PROTHROMBIN TIME: CPT

## 2024-03-17 PROCEDURE — 84436 ASSAY OF TOTAL THYROXINE: CPT

## 2024-03-17 PROCEDURE — 84484 ASSAY OF TROPONIN QUANT: CPT

## 2024-03-17 PROCEDURE — 80048 BASIC METABOLIC PNL TOTAL CA: CPT

## 2024-03-17 PROCEDURE — 93005 ELECTROCARDIOGRAM TRACING: CPT

## 2024-03-17 PROCEDURE — 99285 EMERGENCY DEPT VISIT HI MDM: CPT | Mod: 25

## 2024-03-17 PROCEDURE — 85025 COMPLETE CBC W/AUTO DIFF WBC: CPT

## 2024-03-17 PROCEDURE — 36415 COLL VENOUS BLD VENIPUNCTURE: CPT

## 2024-03-17 PROCEDURE — 71045 X-RAY EXAM CHEST 1 VIEW: CPT

## 2024-03-17 PROCEDURE — 84443 ASSAY THYROID STIM HORMONE: CPT

## 2024-03-17 PROCEDURE — 85730 THROMBOPLASTIN TIME PARTIAL: CPT

## 2024-03-18 PROBLEM — E03.9 HYPOTHYROIDISM, UNSPECIFIED: Chronic | Status: ACTIVE | Noted: 2024-03-17

## 2024-03-18 PROBLEM — I10 ESSENTIAL (PRIMARY) HYPERTENSION: Chronic | Status: ACTIVE | Noted: 2024-03-17

## 2024-03-18 PROBLEM — E78.5 HYPERLIPIDEMIA, UNSPECIFIED: Chronic | Status: ACTIVE | Noted: 2024-03-17

## 2024-03-19 ENCOUNTER — APPOINTMENT (OUTPATIENT)
Dept: CARDIOLOGY | Facility: CLINIC | Age: 59
End: 2024-03-19
Payer: COMMERCIAL

## 2024-03-19 ENCOUNTER — NON-APPOINTMENT (OUTPATIENT)
Age: 59
End: 2024-03-19

## 2024-03-19 VITALS
BODY MASS INDEX: 34.65 KG/M2 | HEIGHT: 70 IN | RESPIRATION RATE: 16 BRPM | DIASTOLIC BLOOD PRESSURE: 82 MMHG | HEART RATE: 69 BPM | SYSTOLIC BLOOD PRESSURE: 129 MMHG | WEIGHT: 242 LBS

## 2024-03-19 DIAGNOSIS — R00.2 PALPITATIONS: ICD-10-CM

## 2024-03-19 PROCEDURE — 93000 ELECTROCARDIOGRAM COMPLETE: CPT

## 2024-03-19 PROCEDURE — G2211 COMPLEX E/M VISIT ADD ON: CPT | Mod: NC,1L

## 2024-03-19 PROCEDURE — 99214 OFFICE O/P EST MOD 30 MIN: CPT

## 2024-03-19 NOTE — HISTORY OF PRESENT ILLNESS
[FreeTextEntry1] : Patient presents to the office today because he was in the hospital on Sunday where he presented with palpitations.  The patient woke up around 4 AM feeling reasonably well but just having woken up.  He soon developed a feeling going up from his stomach that he equates with his reflux but was associated with significant fluttering and palpitations.  This went on for about an hour.  He checked his blood pressure with his machine and it was low and it read his heart rate up to 187.  After the hour he finally went to the ER.  By the time he got to the ER he was feeling better.  He was evaluated there and there were no significant abnormalities and he was sent home.  He does report some intermittent palpitations over the years but nothing as significant as this.  He always associated this with his reflux.  He has otherwise been feeling well.  Patient denies chest pain, shortness of breath, orthopnea, presyncope, syncope.

## 2024-03-19 NOTE — DISCUSSION/SUMMARY
[FreeTextEntry1] : 1. Check echocardiogram and 30-day event monitor to evaluate his palpitations. 2. Check nuclear stress test given his palpitations and his history of CAD. 3. He will add co-Q10 to see if this helps with the aches and pains he is having.  If it does not we will consider changing his statin. 4. Continue other current cardiac meds in doses as noted above for hypertension, dyslipidemia and CAD.   5. Monitor BP at home, keep a log and bring to f/u.   6. Patient encouraged to work on diet to lose weight. 7. Patient is encouraged to exercise at least 30 minutes a day everyday of the week. 8. Continue CPAP for sleep apnea. 9. Follow up here in 6 weeks. [EKG obtained to assist in diagnosis and management of assessed problem(s)] : EKG obtained to assist in diagnosis and management of assessed problem(s)

## 2024-03-19 NOTE — ASSESSMENT
[FreeTextEntry1] : Echocardiogram November 12, 2020 demonstrated left ventricle normal size and function with ejection fraction of 55 to 60%. Trace pulmonic regurgitation noted but no significant structural abnormalities.  Exercise stress test November 12, 2020 during which the patient exercised via a Jerman protocol for 9 minutes, totaling 10 METS. Peak heart rate achieved was 148 bpm, representing 90% of age-predicted maximum. Blood pressure was elevated at baseline with a normal response to exercise. EKG showed horizontal ST depressions in the inferior and anterolateral leads concerning for ischemia.  Following abnormal exercise stress testing patient underwent nuclear stress test December 15, 2020 during which he exercised via a Jerman protocol for 10 minutes, totaling 11 METS. Peak heart rate achieved was 142 bpm, representing 86% of age-predicted maximum. Blood pressure response was normal. EKG showed inferior and anterolateral ST depressions concerning for ischemia. Evaluation of nuclear imaging showed a reversible defect in the inferior wall concerning for possible ischemia in the setting of significant diaphragmatic and bowel attenuation. Patient also noted to have borderline TID visually.  Cardiac CTA April 12, 2021 demonstrates soft plaque causing mild stenosis in the proximal LAD and proximal circumflex. Calcium score of 4.  EKG: Sinus rhythm with no significant ST or T wave changes. Left atrial enlargement.  58-year-old man with a past medical history of mild CAD, hypertension, ERNESTINA on CPAP, obesity who presents to me for followup evaluation. Patient presents back today having had an episode of palpitations.  By the time he got to the hospital his palpitations have resolved and his EKGs and telemetry monitoring were normal.  It certainly possible that this represented some form of arrhythmia, likely some form of SVT.  I will evaluate this further with echocardiogram and event monitor.  I will also have him do a stress test also given his history of CAD.  Blood pressure overall appears to be well-controlled even though it was low the day of this event.  His other recent blood work was good.  He does report some recent pains in his shoulders which she is concerned could be related to his statin.  I have asked him to try co-Q10 to see if this helps before considering changing his statin.

## 2024-03-26 ENCOUNTER — APPOINTMENT (OUTPATIENT)
Dept: CARDIOLOGY | Facility: CLINIC | Age: 59
End: 2024-03-26
Payer: COMMERCIAL

## 2024-03-26 PROCEDURE — 93306 TTE W/DOPPLER COMPLETE: CPT

## 2024-04-05 ENCOUNTER — APPOINTMENT (OUTPATIENT)
Dept: CARDIOLOGY | Facility: CLINIC | Age: 59
End: 2024-04-05

## 2024-04-16 ENCOUNTER — APPOINTMENT (OUTPATIENT)
Dept: CARDIOLOGY | Facility: CLINIC | Age: 59
End: 2024-04-16
Payer: COMMERCIAL

## 2024-04-16 PROCEDURE — 93015 CV STRESS TEST SUPVJ I&R: CPT

## 2024-04-16 PROCEDURE — 78452 HT MUSCLE IMAGE SPECT MULT: CPT

## 2024-04-16 PROCEDURE — A9500: CPT

## 2024-04-17 ENCOUNTER — APPOINTMENT (OUTPATIENT)
Dept: CARDIOLOGY | Facility: CLINIC | Age: 59
End: 2024-04-17

## 2024-04-30 ENCOUNTER — APPOINTMENT (OUTPATIENT)
Dept: CARDIOLOGY | Facility: CLINIC | Age: 59
End: 2024-04-30

## 2024-05-14 ENCOUNTER — APPOINTMENT (OUTPATIENT)
Dept: CARDIOLOGY | Facility: CLINIC | Age: 59
End: 2024-05-14
Payer: COMMERCIAL

## 2024-05-14 VITALS
SYSTOLIC BLOOD PRESSURE: 120 MMHG | HEIGHT: 70 IN | BODY MASS INDEX: 34.07 KG/M2 | RESPIRATION RATE: 16 BRPM | WEIGHT: 238 LBS | HEART RATE: 76 BPM | DIASTOLIC BLOOD PRESSURE: 80 MMHG

## 2024-05-14 DIAGNOSIS — E66.9 OBESITY, UNSPECIFIED: ICD-10-CM

## 2024-05-14 DIAGNOSIS — I10 ESSENTIAL (PRIMARY) HYPERTENSION: ICD-10-CM

## 2024-05-14 DIAGNOSIS — E78.5 HYPERLIPIDEMIA, UNSPECIFIED: ICD-10-CM

## 2024-05-14 DIAGNOSIS — G47.33 OBSTRUCTIVE SLEEP APNEA (ADULT) (PEDIATRIC): ICD-10-CM

## 2024-05-14 DIAGNOSIS — I25.10 ATHEROSCLEROTIC HEART DISEASE OF NATIVE CORONARY ARTERY W/OUT ANGINA PECTORIS: ICD-10-CM

## 2024-05-14 PROCEDURE — G2211 COMPLEX E/M VISIT ADD ON: CPT | Mod: NC,1L

## 2024-05-14 PROCEDURE — 99214 OFFICE O/P EST MOD 30 MIN: CPT

## 2024-05-14 RX ORDER — METOCLOPRAMIDE 5 MG/1
5 TABLET ORAL
Refills: 0 | Status: ACTIVE | COMMUNITY

## 2024-05-14 NOTE — HISTORY OF PRESENT ILLNESS
[FreeTextEntry1] : Patient presents back to the office today feeling much better.  He has not had really any significant episodes of palpitations.  He gets very brief episodes here and there and had 1 on Sunday that was a little longer but not very bad.  Nothing like what he had before he saw me that day.  No symptoms of chest pain or shortness of breath either.  Patient denies orthopnea, presyncope, syncope.

## 2024-05-14 NOTE — ASSESSMENT
[FreeTextEntry1] : Echocardiogram November 12, 2020 demonstrated left ventricle normal size and function with ejection fraction of 55 to 60%. Trace pulmonic regurgitation noted but no significant structural abnormalities.  Exercise stress test November 12, 2020 during which the patient exercised via a Jerman protocol for 9 minutes, totaling 10 METS. Peak heart rate achieved was 148 bpm, representing 90% of age-predicted maximum. Blood pressure was elevated at baseline with a normal response to exercise. EKG showed horizontal ST depressions in the inferior and anterolateral leads concerning for ischemia.  Following abnormal exercise stress testing patient underwent nuclear stress test December 15, 2020 during which he exercised via a Jerman protocol for 10 minutes, totaling 11 METS. Peak heart rate achieved was 142 bpm, representing 86% of age-predicted maximum. Blood pressure response was normal. EKG showed inferior and anterolateral ST depressions concerning for ischemia. Evaluation of nuclear imaging showed a reversible defect in the inferior wall concerning for possible ischemia in the setting of significant diaphragmatic and bowel attenuation. Patient also noted to have borderline TID visually.  Cardiac CTA April 12, 2021 demonstrates soft plaque causing mild stenosis in the proximal LAD and proximal circumflex. Calcium score of 4.  Event monitor March 30-May 6, 2024 demonstrated a presenting rhythm of sinus with an average heart rate of 70 bpm, max 120, minimum 50 bpm.  No arrhythmias were noted.  Echocardiogram March 26, 2024 demonstrated left ventricle normal size and function with ejection fraction of 55 to 60%.  Trace mitral and tricuspid regurgitation.  Nuclear stress test April 16, 2024 during which the patient exercised via a Jerman protocol for 10 minutes and 30 seconds, totaling 11 METS.  Peak heart rate, presenting 92% of age-predicted maximum.  Blood pressure response to exercise was normal.  EKG demonstrated horizontal ST depressions in the inferior and anterolateral leads with exercise.  However of more importance were the nuclear images which showed no evidence of ischemia or infarct with ejection fraction of 67%.  58-year-old man with a past medical history of mild CAD, hypertension, ERNESTINA on CPAP, obesity who presents to me for followup evaluation. Patient is feeling better with no real palpitations.  He is extended event monitor showed no evidence of arrhythmia although he did not have any significant palpitations while wearing it.  Echocardiogram shows a normal EF and no significant valve disease.  Stress testing shows no evidence of ischemia.  The findings in the inferior wall appear fixed and consistent with bowel attenuation.  At this time I do not believe an additional cardiac testing is needed.  He is looking into GI evaluation including endoscopy and colonoscopy for which there is no cardiac contraindication.  If he were to have more palpitations he will let me know and we can consider additional monitoring or even possibly a loop recorder.  Blood pressure remains well-controlled.

## 2024-05-14 NOTE — DISCUSSION/SUMMARY
[FreeTextEntry1] : 1. No additional cardiac testing at this time. 2. Continue other current cardiac meds in doses as noted above for hypertension, dyslipidemia and CAD including atorvastatin 20 mg daily.   3. Monitor BP at home, keep a log and bring to f/u.   4. Patient encouraged to work on diet to lose weight. 5. Patient is encouraged to exercise at least 30 minutes a day everyday of the week. 6. Continue CPAP for sleep apnea. 7. Follow-up with GI for endoscopy and colonoscopy as needed.  No cardiac contraindication at this time. 8. Follow up here in 6 weeks.

## 2024-09-24 ENCOUNTER — RX RENEWAL (OUTPATIENT)
Age: 59
End: 2024-09-24

## 2024-10-04 ENCOUNTER — NON-APPOINTMENT (OUTPATIENT)
Age: 59
End: 2024-10-04

## 2024-10-04 ENCOUNTER — APPOINTMENT (OUTPATIENT)
Dept: CARDIOLOGY | Facility: CLINIC | Age: 59
End: 2024-10-04
Payer: COMMERCIAL

## 2024-10-04 VITALS
WEIGHT: 244 LBS | HEART RATE: 69 BPM | SYSTOLIC BLOOD PRESSURE: 130 MMHG | RESPIRATION RATE: 16 BRPM | HEIGHT: 70 IN | BODY MASS INDEX: 34.93 KG/M2 | DIASTOLIC BLOOD PRESSURE: 76 MMHG

## 2024-10-04 DIAGNOSIS — G47.33 OBSTRUCTIVE SLEEP APNEA (ADULT) (PEDIATRIC): ICD-10-CM

## 2024-10-04 DIAGNOSIS — E66.9 OBESITY, UNSPECIFIED: ICD-10-CM

## 2024-10-04 DIAGNOSIS — I10 ESSENTIAL (PRIMARY) HYPERTENSION: ICD-10-CM

## 2024-10-04 DIAGNOSIS — E78.5 HYPERLIPIDEMIA, UNSPECIFIED: ICD-10-CM

## 2024-10-04 PROCEDURE — 99214 OFFICE O/P EST MOD 30 MIN: CPT

## 2024-10-04 PROCEDURE — 93000 ELECTROCARDIOGRAM COMPLETE: CPT

## 2024-10-04 PROCEDURE — G2211 COMPLEX E/M VISIT ADD ON: CPT | Mod: NC

## 2024-10-04 NOTE — DISCUSSION/SUMMARY
[FreeTextEntry1] : 1. No additional cardiac testing at this time. 2. Continue other current cardiac meds in doses as noted above for hypertension, dyslipidemia and CAD including atorvastatin 20 mg daily.   3. Monitor BP at home, keep a log and bring to f/u.   4. Patient encouraged to work on diet to lose weight. 5. Patient is encouraged to exercise at least 30 minutes a day everyday of the week. 6. Continue CPAP for sleep apnea. 7. Follow-up with GI for colonoscopy.  No cardiac contraindication at this time. 8. Will obtain most recent bloodwork. 9. Follow up here in 4 months. [EKG obtained to assist in diagnosis and management of assessed problem(s)] : EKG obtained to assist in diagnosis and management of assessed problem(s)

## 2024-10-04 NOTE — HISTORY OF PRESENT ILLNESS
[FreeTextEntry1] : Patient presents back to the office today feeling well.  He is not have any further palpitations and reports no physical symptoms at all.  He has talked to his GI and it was determined that he does not need an endoscopy but he still needs to schedule a colonoscopy.  Blood pressure at home have been mostly in the 110s to 130s over 60s to 70s.  He does get some rare higher readings but these seem to happen only at night and only a few times.  Patient denies chest pain, shortness of breath, orthopnea, presyncope, syncope.

## 2024-10-04 NOTE — ASSESSMENT
[FreeTextEntry1] : Echocardiogram November 12, 2020 demonstrated left ventricle normal size and function with ejection fraction of 55 to 60%. Trace pulmonic regurgitation noted but no significant structural abnormalities.  Exercise stress test November 12, 2020 during which the patient exercised via a Jerman protocol for 9 minutes, totaling 10 METS. Peak heart rate achieved was 148 bpm, representing 90% of age-predicted maximum. Blood pressure was elevated at baseline with a normal response to exercise. EKG showed horizontal ST depressions in the inferior and anterolateral leads concerning for ischemia.  Following abnormal exercise stress testing patient underwent nuclear stress test December 15, 2020 during which he exercised via a Jerman protocol for 10 minutes, totaling 11 METS. Peak heart rate achieved was 142 bpm, representing 86% of age-predicted maximum. Blood pressure response was normal. EKG showed inferior and anterolateral ST depressions concerning for ischemia. Evaluation of nuclear imaging showed a reversible defect in the inferior wall concerning for possible ischemia in the setting of significant diaphragmatic and bowel attenuation. Patient also noted to have borderline TID visually.  Cardiac CTA April 12, 2021 demonstrates soft plaque causing mild stenosis in the proximal LAD and proximal circumflex. Calcium score of 4.  Event monitor March 30-May 6, 2024 demonstrated a presenting rhythm of sinus with an average heart rate of 70 bpm, max 120, minimum 50 bpm.  No arrhythmias were noted.  Echocardiogram March 26, 2024 demonstrated left ventricle normal size and function with ejection fraction of 55 to 60%.  Trace mitral and tricuspid regurgitation.  Nuclear stress test April 16, 2024 during which the patient exercised via a Jerman protocol for 10 minutes and 30 seconds, totaling 11 METS.  Peak heart rate, presenting 92% of age-predicted maximum.  Blood pressure response to exercise was normal.  EKG demonstrated horizontal ST depressions in the inferior and anterolateral leads with exercise.  However of more importance were the nuclear images which showed no evidence of ischemia or infarct with ejection fraction of 67%.  EKG: Sinus rhythm with no significant ST or T wave changes.  59-year-old man with a past medical history of mild CAD, hypertension, ERNESTINA on CPAP, obesity who presents to me for followup evaluation. Patient presents back feeling well.  He is not having any symptoms at this time.  Blood pressure overall seems to be controlled although sometimes it is higher at night.  Have asked him to start monitoring it more at night to see if that is a pattern or just a coincidence.  I would not make any changes to medication for today.  He had recent blood work and I will get a copy.  EKG is unremarkable.

## 2025-04-01 ENCOUNTER — NON-APPOINTMENT (OUTPATIENT)
Age: 60
End: 2025-04-01

## 2025-04-03 ENCOUNTER — NON-APPOINTMENT (OUTPATIENT)
Age: 60
End: 2025-04-03

## 2025-04-03 ENCOUNTER — APPOINTMENT (OUTPATIENT)
Dept: CARDIOLOGY | Facility: CLINIC | Age: 60
End: 2025-04-03
Payer: COMMERCIAL

## 2025-04-03 VITALS
RESPIRATION RATE: 16 BRPM | HEART RATE: 74 BPM | BODY MASS INDEX: 35.07 KG/M2 | SYSTOLIC BLOOD PRESSURE: 130 MMHG | WEIGHT: 245 LBS | DIASTOLIC BLOOD PRESSURE: 82 MMHG | HEIGHT: 70 IN

## 2025-04-03 DIAGNOSIS — G47.33 OBSTRUCTIVE SLEEP APNEA (ADULT) (PEDIATRIC): ICD-10-CM

## 2025-04-03 DIAGNOSIS — E78.5 HYPERLIPIDEMIA, UNSPECIFIED: ICD-10-CM

## 2025-04-03 DIAGNOSIS — E66.9 OBESITY, UNSPECIFIED: ICD-10-CM

## 2025-04-03 DIAGNOSIS — I10 ESSENTIAL (PRIMARY) HYPERTENSION: ICD-10-CM

## 2025-04-03 PROCEDURE — 99214 OFFICE O/P EST MOD 30 MIN: CPT

## 2025-04-03 PROCEDURE — 93000 ELECTROCARDIOGRAM COMPLETE: CPT

## 2025-05-17 ENCOUNTER — NON-APPOINTMENT (OUTPATIENT)
Age: 60
End: 2025-05-17

## 2025-06-24 ENCOUNTER — APPOINTMENT (OUTPATIENT)
Dept: CARDIOLOGY | Facility: CLINIC | Age: 60
End: 2025-06-24
Payer: COMMERCIAL

## 2025-06-24 VITALS
HEART RATE: 67 BPM | DIASTOLIC BLOOD PRESSURE: 70 MMHG | BODY MASS INDEX: 35.65 KG/M2 | HEIGHT: 70 IN | SYSTOLIC BLOOD PRESSURE: 110 MMHG | RESPIRATION RATE: 16 BRPM | WEIGHT: 249 LBS

## 2025-06-24 PROCEDURE — 93000 ELECTROCARDIOGRAM COMPLETE: CPT

## 2025-06-24 PROCEDURE — 99214 OFFICE O/P EST MOD 30 MIN: CPT

## 2025-07-01 PROCEDURE — 93272 ECG/REVIEW INTERPRET ONLY: CPT

## 2025-08-13 ENCOUNTER — APPOINTMENT (OUTPATIENT)
Dept: CARDIOLOGY | Facility: CLINIC | Age: 60
End: 2025-08-13
Payer: COMMERCIAL

## 2025-08-14 ENCOUNTER — APPOINTMENT (OUTPATIENT)
Dept: CARDIOLOGY | Facility: CLINIC | Age: 60
End: 2025-08-14
Payer: COMMERCIAL

## 2025-08-14 VITALS
HEIGHT: 70 IN | HEART RATE: 70 BPM | BODY MASS INDEX: 36.36 KG/M2 | WEIGHT: 254 LBS | RESPIRATION RATE: 16 BRPM | SYSTOLIC BLOOD PRESSURE: 125 MMHG | DIASTOLIC BLOOD PRESSURE: 72 MMHG

## 2025-08-14 DIAGNOSIS — I25.10 ATHEROSCLEROTIC HEART DISEASE OF NATIVE CORONARY ARTERY W/OUT ANGINA PECTORIS: ICD-10-CM

## 2025-08-14 DIAGNOSIS — I10 ESSENTIAL (PRIMARY) HYPERTENSION: ICD-10-CM

## 2025-08-14 DIAGNOSIS — E78.5 HYPERLIPIDEMIA, UNSPECIFIED: ICD-10-CM

## 2025-08-14 DIAGNOSIS — G47.33 OBSTRUCTIVE SLEEP APNEA (ADULT) (PEDIATRIC): ICD-10-CM

## 2025-08-14 PROCEDURE — 99214 OFFICE O/P EST MOD 30 MIN: CPT

## 2025-08-14 PROCEDURE — 93000 ELECTROCARDIOGRAM COMPLETE: CPT

## 2025-08-14 PROCEDURE — 93784 AMBL BP MNTR W/SOFTWARE: CPT
